# Patient Record
Sex: MALE | Race: WHITE | Employment: PART TIME | URBAN - METROPOLITAN AREA
[De-identification: names, ages, dates, MRNs, and addresses within clinical notes are randomized per-mention and may not be internally consistent; named-entity substitution may affect disease eponyms.]

---

## 2021-01-15 ENCOUNTER — TRANSCRIBE ORDER (OUTPATIENT)
Dept: SCHEDULING | Age: 86
End: 2021-01-15

## 2021-01-15 DIAGNOSIS — N18.32 STAGE 3B CHRONIC KIDNEY DISEASE (HCC): Primary | ICD-10-CM

## 2021-02-24 ENCOUNTER — TRANSCRIBE ORDER (OUTPATIENT)
Dept: SCHEDULING | Age: 86
End: 2021-02-24

## 2021-02-24 DIAGNOSIS — N18.32 CHRONIC KIDNEY DISEASE, STAGE 3B (HCC): Primary | ICD-10-CM

## 2021-03-15 ENCOUNTER — TRANSCRIBE ORDER (OUTPATIENT)
Dept: SCHEDULING | Age: 86
End: 2021-03-15

## 2021-03-15 DIAGNOSIS — N18.32 STAGE 3B CHRONIC KIDNEY DISEASE (HCC): Primary | ICD-10-CM

## 2021-06-03 ENCOUNTER — TRANSCRIBE ORDER (OUTPATIENT)
Dept: SCHEDULING | Age: 86
End: 2021-06-03

## 2021-06-03 DIAGNOSIS — N18.32 CHRONIC KIDNEY DISEASE, STAGE 3B (HCC): Primary | ICD-10-CM

## 2021-10-06 ENCOUNTER — APPOINTMENT (OUTPATIENT)
Dept: GENERAL RADIOLOGY | Age: 86
End: 2021-10-06
Attending: EMERGENCY MEDICINE
Payer: COMMERCIAL

## 2021-10-06 ENCOUNTER — HOSPITAL ENCOUNTER (EMERGENCY)
Age: 86
Discharge: HOME | End: 2021-10-06
Attending: EMERGENCY MEDICINE
Payer: COMMERCIAL

## 2021-10-06 VITALS
HEIGHT: 72 IN | SYSTOLIC BLOOD PRESSURE: 167 MMHG | DIASTOLIC BLOOD PRESSURE: 75 MMHG | HEART RATE: 66 BPM | TEMPERATURE: 98.5 F | WEIGHT: 222 LBS | OXYGEN SATURATION: 98 % | BODY MASS INDEX: 30.07 KG/M2 | RESPIRATION RATE: 16 BRPM

## 2021-10-06 DIAGNOSIS — I71.40 ABDOMINAL AORTIC ANEURYSM (AAA) >39 MM DIAMETER: ICD-10-CM

## 2021-10-06 DIAGNOSIS — M51.36 DDD (DEGENERATIVE DISC DISEASE), LUMBAR: ICD-10-CM

## 2021-10-06 DIAGNOSIS — M54.50 ACUTE RIGHT-SIDED LOW BACK PAIN WITHOUT SCIATICA: Primary | ICD-10-CM

## 2021-10-06 PROCEDURE — 72100 X-RAY EXAM L-S SPINE 2/3 VWS: CPT

## 2021-10-06 PROCEDURE — 99283 EMERGENCY DEPT VISIT LOW MDM: CPT

## 2021-10-06 RX ORDER — TRAMADOL HYDROCHLORIDE AND ACETAMINOPHEN 37.5; 325 MG/1; MG/1
1 TABLET ORAL
Qty: 12 TABLET | Refills: 0 | Status: SHIPPED | OUTPATIENT
Start: 2021-10-06 | End: 2021-10-09

## 2021-10-06 NOTE — DISCHARGE INSTRUCTIONS
The x-rays show was severe arthritis of your lower back. We recommend that you reach out to your primary care and the orthopedic specialist if the pain persists. Also, the x-rays show an abdominal aortic aneurysm (AAA). We saw this back in 2016 when we did a CAT scan, but the aneurysm has grown slightly. Please call vascular surgery [place], [phone]) to schedule follow-up regarding your AAA.

## 2021-10-06 NOTE — ED PROVIDER NOTES
EMERGENCY DEPARTMENT HISTORY AND PHYSICAL EXAM      Date: 10/6/2021  Patient Name: Alysa Carter History of Presenting Illness     Chief Complaint   Patient presents with    Back Pain     Pain at left low back since yesterday radiating to right hip and leg. Denies injury. History Provided By: Patient    HPI: Alysa Carter, 82 y.o. male with a history of colon cancer, diverticulitis, gout, CHF and others presents to the ED with cc of several days of 7 out of 10 constant, achy lower back pain that radiates to the right hip. Pain is much worse with weightbearing. Patient tells me he is using a cane at home and has a wheelchair that he is needed to use because it is painful to walk. There has been no fall. There is been no fever. He denies any numbness or weakness. There has been no chest pain or shortness of breath. Patient denies abdominal pain. There has been no nausea, vomiting or diarrhea. There are no other complaints, changes, or physical findings at this time. PCP: Brian Hinds MD    Current Outpatient Medications   Medication Sig Dispense Refill    traMADol-acetaminophen (Ultracet) 37.5-325 mg per tablet Take 1 Tablet by mouth every six (6) hours as needed for Pain for up to 3 days. Max Daily Amount: 4 Tablets. 12 Tablet 0    FOLIC ACID/MULTIVIT-MIN/LUTEIN (CENTRUM SILVER PO) Take  by mouth daily.  allopurinol (ZYLOPRIM) 100 mg tablet Take  by mouth every morning. Indications: GOUT      amLODIPine (NORVASC) 10 mg tablet Take 1 Tab by mouth daily. 30 Tab 1    aspirin delayed-release 81 mg tablet Take 81 mg by mouth nightly.  carvedilol (COREG) 25 mg tablet Take 1 Tab by mouth two (2) times daily (with meals). 60 Tab 6    simvastatin (ZOCOR) 40 mg tablet Take 1 Tab by mouth every evening. 30 Tab 6    Cholecalciferol, Vitamin D3, (VITAMIN D3) 1,000 unit Cap Take 1 Tab by mouth daily.        Past History     Past Medical History:  Past Medical History:   Diagnosis Date    Arthritis     Cataract     Chronic systolic heart failure ([facility]     As of 16 pt denies any cardiac problems and does not see a cardiologist; MET >4 per pt    Colon cancer ([facility] [facility] 2013    surgery; chemotherapy;  pt in remission as of 16    Diverticulitis 2016    Gout     as of 16 pt denies any sx    H/O seasonal allergies     spring    Hypercholesterolemia     Hypertension        Past Surgical History:  Past Surgical History:   Procedure Laterality Date    COLONOSCOPY N/A 2016    COLONOSCOPY performed by Saintclair Drivers, MD at [facility] HX GI  13    Laparoscopic splenic flexure resection    HX GI  2013    Laparoscopic sigmoid colectomy due to colon CA    HX TONSILLECTOMY  age 8    [place] COLONOSCOPY W/BIOPSY SINGLE/MULTIPLE  10/18/2013         MO EGD TRANSORAL BIOPSY SINGLE/MULTIPLE  10/18/2013            Family History:  Family History   Problem Relation Age of Onset    Heart Disease Mother        Social History:  Social History     Tobacco Use    Smoking status: Former Smoker     Packs/day: 2.00     Quit date: 10/17/1998     Years since quittin.9    Smokeless tobacco: Current User    Tobacco comment: rare chewing tobacco   Substance Use Topics    Alcohol use: No    Drug use: No     Types: Prescription, OTC       Allergies:  No Known Allergies  Review of Systems   Review of Systems   Constitutional: Negative for fatigue and fever. HENT: Negative for congestion, ear pain and rhinorrhea. Eyes: Negative for pain and redness. Respiratory: Negative for cough and wheezing. Cardiovascular: Negative for chest pain and palpitations. Gastrointestinal: Negative for abdominal pain, nausea and vomiting. Genitourinary: Negative for dysuria, frequency and urgency. Musculoskeletal: Positive for back pain (That radiates to the right hip). Negative for neck pain and neck stiffness. Skin: Negative for rash and wound. Neurological: Negative for weakness, light-headedness, numbness and headaches. Physical Exam   Physical Exam  Vitals and nursing note reviewed. Constitutional:       General: He is not in acute distress. Appearance: He is well-developed. He is not toxic-appearing. HENT:      Head: Normocephalic and atraumatic. No right periorbital erythema or left periorbital erythema. Jaw: No trismus. Right Ear: External ear normal.      Left Ear: External ear normal.      Nose: Nose normal.      Mouth/Throat:      Pharynx: Uvula midline. Eyes:      General: No scleral icterus. Conjunctiva/sclera: Conjunctivae normal.      Pupils: Pupils are equal, round, and reactive to light. Cardiovascular:      Rate and Rhythm: Normal rate and regular rhythm. Heart sounds: Normal heart sounds. Pulmonary:      Effort: Pulmonary effort is normal. No tachypnea, accessory muscle usage or respiratory distress. Breath sounds: Normal breath sounds. No decreased breath sounds or wheezing. Abdominal:      Palpations: Abdomen is soft. Abdomen is not rigid. Tenderness: There is no abdominal tenderness. There is no guarding. Musculoskeletal:         General: Normal range of motion. Cervical back: Full passive range of motion without pain and normal range of motion. Back:       Comments:   LOWER BACK:  No bruising, redness or swelling. No step off. Diffuse discomfort that radiates to the right hip. No CVA tenderness   Skin:     Findings: No rash. Neurological:      Mental Status: He is alert and oriented to person, place, and time. He is not disoriented. GCS: GCS eye subscore is 4. GCS verbal subscore is 5. GCS motor subscore is 6. Cranial Nerves: No cranial nerve deficit. Sensory: No sensory deficit.    Psychiatric:         Speech: Speech normal.       Diagnostic Study Results     Labs -   No results found for this or any previous visit (from the past 12 hour(s)). Radiologic Studies -   XR SPINE LUMB 2 OR 3 V   Final Result   1. Severe degenerative disease. 2. Increased AAA (44 mm, versus 39 mm in 2016). CT Results  (Last 48 hours)    None        CXR Results  (Last 48 hours)    None        Medical Decision Making   I am the first provider for this patient. I reviewed the vital signs, available nursing notes, past medical history, past surgical history, family history and social history. Vital Signs-Reviewed the patient's vital signs. Patient Vitals for the past 12 hrs:   Temp Pulse Resp BP SpO2   10/06/21 1402 -- 66 16 (!) 167/75 98 %   10/06/21 1232 98.5 °F (36.9 °C) (!) 44 16 (!) 155/80 99 %       Pulse Oximetry Analysis - 99% on RA    Records Reviewed: Nursing Notes, Old Medical Records, Previous Radiology Studies and Previous Laboratory Studies    Provider Notes (Medical Decision Making):   DDx: Compression fracture, HNP, DDD, others    Plain films demonstrate a severe degenerative disease. Incidentally identified is an increased AAA from 2016. Patient has no abdominal pain. I PerfectServe the vascular NP, however patient is ready for discharge and tells me he would like to go. Believe safe for discharge and outpatient follow-up regarding his low back pain and AAA. Offer medication, orthopedics referral for his back pain and vascular surgery referral for the AAA. Strict return precautions for worsening pain or any concerns. ED Course:   Initial assessment performed. The patients presenting problems have been discussed, and they are in agreement with the care plan formulated and outlined with them. I have encouraged them to ask questions as they arise throughout their visit. Disposition:  Discharge    PLAN:  1.    Discharge Medication List as of 10/6/2021  3:02 PM      START taking these medications    Details   traMADol-acetaminophen (Ultracet) 37.5-325 mg per tablet Take 1 Tablet by mouth every six (6) hours as needed for Pain for up to 3 days. Max Daily Amount: 4 Tablets., Normal, Disp-12 Tablet, R-0         CONTINUE these medications which have NOT CHANGED    Details   FOLIC ACID/MULTIVIT-MIN/LUTEIN (CENTRUM SILVER PO) Take  by mouth daily. , Historical Med      allopurinol (ZYLOPRIM) 100 mg tablet Take  by mouth every morning. Indications: GOUT, Historical Med      amLODIPine (NORVASC) 10 mg tablet Take 1 Tab by mouth daily. , Print, Disp-30 Tab, R-1      aspirin delayed-release 81 mg tablet Take 81 mg by mouth nightly., Historical Med      carvedilol (COREG) 25 mg tablet Take 1 Tab by mouth two (2) times daily (with meals). , Print, Disp-60 Tab, R-6      simvastatin (ZOCOR) 40 mg tablet Take 1 Tab by mouth every evening., Normal, Disp-30 Tab, R-6      Cholecalciferol, Vitamin D3, (VITAMIN D3) 1,000 unit Cap Take 1 Tab by mouth daily. , Historical Med           2. Follow-up Information     Follow up With Specialties Details Why Contact Patricia Borja MD Internal Medicine Call  PRIMARY CARE: call to schedule follow up regarding your abdominal aortic aneurysm (AAA) [address] [facility] [facility]      Betty Miranda NP Vascular Surgery Call  VASCULAR SURGERY: call tomorrow to schedule follow up regarding your AAA [address]      Gaviota Pagan MD Orthopedic Surgery Call  [facility] / Mathew Kay: as needed regarding your back/hip pain [address]  [address]  [address] [facility]  [phone]          Return to ED if worse     Diagnosis     Clinical Impression:   1. Acute right-sided low back pain without sciatica    2. DDD (degenerative disc disease), lumbar    3.  Abdominal aortic aneurysm (AAA) >39 mm diameter (HCC)

## 2021-10-06 NOTE — ED NOTES
Discharge instructions reviewed with pt by provider. pt verbalized understanding of discharge instructions. Copy of discharge paperwork given. Patient condition stable, respiratory status within normal limits, neuro status intact.

## 2022-08-08 ENCOUNTER — APPOINTMENT (OUTPATIENT)
Dept: GENERAL RADIOLOGY | Age: 87
End: 2022-08-08
Attending: PHYSICIAN ASSISTANT
Payer: COMMERCIAL

## 2022-08-08 ENCOUNTER — HOSPITAL ENCOUNTER (EMERGENCY)
Age: 87
Discharge: HOME | End: 2022-08-08
Attending: EMERGENCY MEDICINE
Payer: COMMERCIAL

## 2022-08-08 VITALS
DIASTOLIC BLOOD PRESSURE: 69 MMHG | BODY MASS INDEX: 29.03 KG/M2 | WEIGHT: 219 LBS | SYSTOLIC BLOOD PRESSURE: 187 MMHG | OXYGEN SATURATION: 100 % | TEMPERATURE: 97.5 F | HEIGHT: 73 IN | RESPIRATION RATE: 14 BRPM | HEART RATE: 48 BPM

## 2022-08-08 DIAGNOSIS — Z87.39 HISTORY OF GOUT: ICD-10-CM

## 2022-08-08 DIAGNOSIS — M25.551 ACUTE RIGHT HIP PAIN: Primary | ICD-10-CM

## 2022-08-08 DIAGNOSIS — M25.561 ACUTE PAIN OF RIGHT KNEE: ICD-10-CM

## 2022-08-08 PROCEDURE — 99283 EMERGENCY DEPT VISIT LOW MDM: CPT

## 2022-08-08 PROCEDURE — 73562 X-RAY EXAM OF KNEE 3: CPT

## 2022-08-08 RX ORDER — HYDROCODONE BITARTRATE AND ACETAMINOPHEN 5; 325 MG/1; MG/1
1 TABLET ORAL
Qty: 9 TABLET | Refills: 0 | Status: SHIPPED | OUTPATIENT
Start: 2022-08-08 | End: 2022-08-11

## 2022-08-08 RX ORDER — PREDNISONE 10 MG/1
TABLET ORAL
Qty: 21 TABLET | Refills: 0 | Status: SHIPPED | OUTPATIENT
Start: 2022-08-08

## 2022-08-08 NOTE — ED PROVIDER NOTES
EMERGENCY DEPARTMENT HISTORY AND PHYSICAL EXAM      Date: 8/8/2022  Patient Name: America Lares. History of Presenting Illness     Chief Complaint   Patient presents with    Knee Pain     Right knee pain that radiates to right hip ; hard to put weight down. Dr. Saul Gilliland pulled fluid off his knee last Monday       History Provided By: Patient    HPI: America Lares., 82 y.o. male presents ambulatory to the ED with cc of several days of moderately severe and constant right hip pain that radiates down to the right knee and leg. Pain is worse with movement. He denies any fall or injury. He tells me last week his primary care, Dr. Saul Gilliland, \"pulled off some fluid\" on his right knee. Patient does endorse a history of gout for which she takes gout medication. He has been well lately without fever. He denies any throat pain or eye pain. There has been no chest pain or shortness of breath. He denies abdominal pain or flank pain. Denies any unusual skin rash. He has no headache. There are no other complaints, changes, or physical findings at this time. PCP: Ranjit Johnson MD    Current Outpatient Medications   Medication Sig Dispense Refill    predniSONE (STERAPRED DS) 10 mg dose pack Per Dose Pack instructions 21 Tablet 0    HYDROcodone-acetaminophen (NORCO) 5-325 mg per tablet Take 1 Tablet by mouth every eight (8) hours as needed for Pain for up to 3 days. Max Daily Amount: 3 Tablets. 9 Tablet 0    FOLIC ACID/MULTIVIT-MIN/LUTEIN (CENTRUM SILVER PO) Take  by mouth daily. allopurinol (ZYLOPRIM) 100 mg tablet Take  by mouth every morning. Indications: GOUT      amLODIPine (NORVASC) 10 mg tablet Take 1 Tab by mouth daily. 30 Tab 1    aspirin delayed-release 81 mg tablet Take 81 mg by mouth nightly. carvedilol (COREG) 25 mg tablet Take 1 Tab by mouth two (2) times daily (with meals). 60 Tab 6    simvastatin (ZOCOR) 40 mg tablet Take 1 Tab by mouth every evening.  30 Tab 6 Cholecalciferol, Vitamin D3, (VITAMIN D3) 1,000 unit Cap Take 1 Tab by mouth daily. Past History     Past Medical History:  Past Medical History:   Diagnosis Date    Arthritis     Cataract     Chronic systolic heart failure ([address]     As of 16 pt denies any cardiac problems and does not see a cardiologist; MET >4 per pt    Colon cancer ([address] 2013    surgery; chemotherapy;  pt in remission as of 16    Diverticulitis 2016    Gout     as of 16 pt denies any sx    H/O seasonal allergies     spring    Hypercholesterolemia     Hypertension        Past Surgical History:  Past Surgical History:   Procedure Laterality Date    COLONOSCOPY N/A 2016    COLONOSCOPY performed by Matthew Lyles MD at [facility]    HX GI  13    Laparoscopic splenic flexure resection    HX GI  2013    Laparoscopic sigmoid colectomy due to colon CA    HX TONSILLECTOMY  age 8    [place] COLONOSCOPY W/BIOPSY SINGLE/MULTIPLE  10/18/2013         [place] EGD TRANSORAL BIOPSY SINGLE/MULTIPLE  10/18/2013            Family History:  Family History   Problem Relation Age of Onset    Heart Disease Mother        Social History:  Social History     Tobacco Use    Smoking status: Former     Packs/day: 2.00     Types: Cigarettes     Quit date: 10/17/1998     Years since quittin.8    Smokeless tobacco: Current    Tobacco comments:     rare chewing tobacco   Substance Use Topics    Alcohol use: No    Drug use: No     Types: Prescription, OTC       Allergies:  No Known Allergies  Review of Systems   Review of Systems   Constitutional:  Negative for fever. HENT:  Negative for sore throat. Eyes:  Negative for pain. Respiratory:  Negative for shortness of breath. Cardiovascular:  Negative for chest pain. Gastrointestinal:  Negative for abdominal pain. Genitourinary:  Negative for flank pain. Musculoskeletal:         Right knee pain and right hip pain   Skin:  Negative for rash.    Neurological:  Negative for headaches. Physical Exam   Physical Exam  Vitals and nursing note reviewed. Constitutional:       General: He is not in acute distress. Appearance: He is well-developed. He is not toxic-appearing. HENT:      Head: Normocephalic and atraumatic. No right periorbital erythema or left periorbital erythema. Right Ear: External ear normal.      Left Ear: External ear normal.      Nose: Nose normal.      Mouth/Throat:      Lips: No lesions. Eyes:      General: No scleral icterus. Conjunctiva/sclera: Conjunctivae normal.      Pupils: Pupils are equal, round, and reactive to light. Cardiovascular:      Rate and Rhythm: Normal rate. Pulmonary:      Effort: Pulmonary effort is normal. No respiratory distress. Abdominal:      Palpations: Abdomen is soft. Tenderness: There is no abdominal tenderness. Musculoskeletal:         General: Normal range of motion. Cervical back: Normal range of motion. Comments:   RIGHT HIP:  No bruising, redness or swelling  Able to flex hip to about 90 degrees  Lateral tenderness to palpation    RIGHT KNEE:  No bruising or redness  Able to flex knee to approximately 90 degrees  Suprapatellar effusion is apparent  Mild diffuse anterior tenderness  Provocative maneuvers deferred   Skin:     Findings: No rash. Neurological:      Mental Status: He is alert and oriented to person, place, and time. He is not disoriented. Cranial Nerves: No cranial nerve deficit. Sensory: No sensory deficit. Psychiatric:         Speech: Speech normal.     Diagnostic Study Results     Labs -   No results found for this or any previous visit (from the past 12 hour(s)). Radiologic Studies -   XR KNEE RT 3 V   Final Result   No acute abnormality. CT Results  (Last 48 hours)      None          CXR Results  (Last 48 hours)      None          Medical Decision Making   I am the first provider for this patient.     I reviewed the vital signs, available nursing notes, past medical history, past surgical history, family history and social history. Vital Signs-Reviewed the patient's vital signs. Patient Vitals for the past 12 hrs:   Temp Pulse Resp BP SpO2   08/08/22 1052 97.5 °F (36.4 °C) (!) 48 14 (!) 187/69 100 %       Pulse Oximetry Analysis - 100% on RA    Records Reviewed: Nursing Notes, Old Medical Records, Previous Radiology Studies, and Previous Laboratory Studies    Provider Notes (Medical Decision Making):   DDx: Gout, effusion, arthritis    ED Course:   Initial assessment performed. The patients presenting problems have been discussed, and they are in agreement with the care plan formulated and outlined with them. I have encouraged them to ask questions as they arise throughout their visit. Disposition:  Discharge    PLAN:  1. Discharge Medication List as of 8/8/2022 11:55 AM        START taking these medications    Details   predniSONE (STERAPRED DS) 10 mg dose pack Per Dose Pack instructions, Normal, Disp-21 Tablet, R-0      HYDROcodone-acetaminophen (NORCO) 5-325 mg per tablet Take 1 Tablet by mouth every eight (8) hours as needed for Pain for up to 3 days. Max Daily Amount: 3 Tablets., Normal, Disp-9 Tablet, R-0           CONTINUE these medications which have NOT CHANGED    Details   FOLIC ACID/MULTIVIT-MIN/LUTEIN (CENTRUM SILVER PO) Take  by mouth daily. , Historical Med      allopurinol (ZYLOPRIM) 100 mg tablet Take  by mouth every morning. Indications: GOUT, Historical Med      amLODIPine (NORVASC) 10 mg tablet Take 1 Tab by mouth daily. , Print, Disp-30 Tab, R-1      aspirin delayed-release 81 mg tablet Take 81 mg by mouth nightly., Historical Med      carvedilol (COREG) 25 mg tablet Take 1 Tab by mouth two (2) times daily (with meals). , Print, Disp-60 Tab, R-6      simvastatin (ZOCOR) 40 mg tablet Take 1 Tab by mouth every evening., Normal, Disp-30 Tab, R-6      Cholecalciferol, Vitamin D3, (VITAMIN D3) 1,000 unit Cap Take 1 Tab by mouth daily. , Historical Med           2. Follow-up Information       Follow up With Specialties Details Why Contact Info    Ronny Storey MD Internal Medicine Physician Call  PRIMARY CARE: call to schedule follow up [address] [facility] [facility]      Genoveva Vargas MD Sports Medicine Physician Call  ORTHO: as needed [address] [place] [address]  [address]  [phone]            Return to ED if worse     Diagnosis     Clinical Impression:   1. Acute right hip pain    2. Acute pain of right knee    3.  History of gout